# Patient Record
Sex: MALE | Race: OTHER | HISPANIC OR LATINO | Employment: OTHER | URBAN - METROPOLITAN AREA
[De-identification: names, ages, dates, MRNs, and addresses within clinical notes are randomized per-mention and may not be internally consistent; named-entity substitution may affect disease eponyms.]

---

## 2020-04-15 ENCOUNTER — OFFICE VISIT (OUTPATIENT)
Dept: FAMILY MEDICINE CLINIC | Facility: CLINIC | Age: 70
End: 2020-04-15
Payer: COMMERCIAL

## 2020-04-15 VITALS
HEIGHT: 69 IN | HEART RATE: 110 BPM | RESPIRATION RATE: 18 BRPM | SYSTOLIC BLOOD PRESSURE: 122 MMHG | TEMPERATURE: 97.9 F | OXYGEN SATURATION: 99 % | WEIGHT: 205 LBS | DIASTOLIC BLOOD PRESSURE: 72 MMHG | BODY MASS INDEX: 30.36 KG/M2

## 2020-04-15 DIAGNOSIS — R09.81 NASAL CONGESTION: ICD-10-CM

## 2020-04-15 DIAGNOSIS — Z12.5 SCREENING FOR PROSTATE CANCER: ICD-10-CM

## 2020-04-15 DIAGNOSIS — Z95.0 PACEMAKER: ICD-10-CM

## 2020-04-15 DIAGNOSIS — N40.0 BENIGN PROSTATIC HYPERPLASIA WITHOUT LOWER URINARY TRACT SYMPTOMS: ICD-10-CM

## 2020-04-15 DIAGNOSIS — I10 ESSENTIAL HYPERTENSION: Primary | ICD-10-CM

## 2020-04-15 DIAGNOSIS — E66.9 OBESITY (BMI 30-39.9): ICD-10-CM

## 2020-04-15 DIAGNOSIS — Z12.11 ENCOUNTER FOR SCREENING COLONOSCOPY: ICD-10-CM

## 2020-04-15 DIAGNOSIS — R53.83 OTHER FATIGUE: ICD-10-CM

## 2020-04-15 DIAGNOSIS — Z11.59 ENCOUNTER FOR HEPATITIS C SCREENING TEST FOR LOW RISK PATIENT: ICD-10-CM

## 2020-04-15 DIAGNOSIS — R52 GENERALIZED BODY ACHES: ICD-10-CM

## 2020-04-15 DIAGNOSIS — M1A.4790 OTHER SECONDARY CHRONIC GOUT OF ANKLE WITHOUT TOPHUS, UNSPECIFIED LATERALITY: ICD-10-CM

## 2020-04-15 DIAGNOSIS — E78.2 MIXED HYPERLIPIDEMIA: ICD-10-CM

## 2020-04-15 DIAGNOSIS — R73.9 HYPERGLYCEMIA: ICD-10-CM

## 2020-04-15 PROBLEM — M1A.9XX0 CHRONIC GOUT WITHOUT TOPHUS: Status: ACTIVE | Noted: 2020-04-15

## 2020-04-15 PROCEDURE — 1036F TOBACCO NON-USER: CPT | Performed by: NURSE PRACTITIONER

## 2020-04-15 PROCEDURE — 3074F SYST BP LT 130 MM HG: CPT | Performed by: NURSE PRACTITIONER

## 2020-04-15 PROCEDURE — 3008F BODY MASS INDEX DOCD: CPT | Performed by: NURSE PRACTITIONER

## 2020-04-15 PROCEDURE — 1160F RVW MEDS BY RX/DR IN RCRD: CPT | Performed by: NURSE PRACTITIONER

## 2020-04-15 PROCEDURE — 99204 OFFICE O/P NEW MOD 45 MIN: CPT | Performed by: NURSE PRACTITIONER

## 2020-04-15 PROCEDURE — 3078F DIAST BP <80 MM HG: CPT | Performed by: NURSE PRACTITIONER

## 2020-04-15 RX ORDER — TAMSULOSIN HYDROCHLORIDE 0.4 MG/1
0.4 CAPSULE ORAL
COMMUNITY
End: 2020-04-15 | Stop reason: SDUPTHER

## 2020-04-15 RX ORDER — GABAPENTIN 400 MG/1
400 CAPSULE ORAL DAILY
COMMUNITY
End: 2020-04-15 | Stop reason: SDUPTHER

## 2020-04-15 RX ORDER — FLUTICASONE PROPIONATE 50 MCG
2 SPRAY, SUSPENSION (ML) NASAL DAILY
Qty: 16 G | Refills: 0 | Status: SHIPPED | OUTPATIENT
Start: 2020-04-15 | End: 2020-05-18

## 2020-04-15 RX ORDER — ATORVASTATIN CALCIUM 40 MG/1
40 TABLET, FILM COATED ORAL DAILY
Qty: 90 TABLET | Refills: 1 | Status: SHIPPED | OUTPATIENT
Start: 2020-04-15 | End: 2020-08-13

## 2020-04-15 RX ORDER — GABAPENTIN 400 MG/1
400 CAPSULE ORAL DAILY
Qty: 90 CAPSULE | Refills: 0 | Status: SHIPPED | OUTPATIENT
Start: 2020-04-15 | End: 2020-07-06

## 2020-04-15 RX ORDER — AMLODIPINE BESYLATE 5 MG/1
5 TABLET ORAL DAILY
COMMUNITY
End: 2020-04-15 | Stop reason: SDUPTHER

## 2020-04-15 RX ORDER — ATORVASTATIN CALCIUM 40 MG/1
40 TABLET, FILM COATED ORAL DAILY
COMMUNITY
End: 2020-04-15 | Stop reason: SDUPTHER

## 2020-04-15 RX ORDER — ALLOPURINOL 100 MG/1
100 TABLET ORAL DAILY
Qty: 90 TABLET | Refills: 0 | Status: SHIPPED | OUTPATIENT
Start: 2020-04-15 | End: 2020-07-06

## 2020-04-15 RX ORDER — AMLODIPINE BESYLATE 5 MG/1
5 TABLET ORAL DAILY
Qty: 90 TABLET | Refills: 1 | Status: SHIPPED | OUTPATIENT
Start: 2020-04-15 | End: 2020-07-02 | Stop reason: HOSPADM

## 2020-04-15 RX ORDER — UREA 10 %
100 LOTION (ML) TOPICAL DAILY
COMMUNITY

## 2020-04-15 RX ORDER — ALLOPURINOL 100 MG/1
100 TABLET ORAL DAILY
COMMUNITY
End: 2020-04-15 | Stop reason: SDUPTHER

## 2020-04-15 RX ORDER — TAMSULOSIN HYDROCHLORIDE 0.4 MG/1
0.4 CAPSULE ORAL
Qty: 90 CAPSULE | Refills: 2 | Status: SHIPPED | OUTPATIENT
Start: 2020-04-15 | End: 2020-08-26 | Stop reason: SDUPTHER

## 2020-04-15 RX ORDER — LORATADINE 10 MG/1
10 TABLET ORAL DAILY
COMMUNITY
End: 2020-05-14 | Stop reason: SDUPTHER

## 2020-04-15 RX ORDER — ASPIRIN 81 MG/1
81 TABLET ORAL DAILY
COMMUNITY

## 2020-04-16 ENCOUNTER — APPOINTMENT (OUTPATIENT)
Dept: LAB | Facility: HOSPITAL | Age: 70
End: 2020-04-16
Payer: COMMERCIAL

## 2020-04-16 DIAGNOSIS — R53.83 OTHER FATIGUE: ICD-10-CM

## 2020-04-16 DIAGNOSIS — Z12.5 SCREENING FOR PROSTATE CANCER: ICD-10-CM

## 2020-04-16 DIAGNOSIS — E78.2 MIXED HYPERLIPIDEMIA: ICD-10-CM

## 2020-04-16 DIAGNOSIS — E66.9 OBESITY (BMI 30-39.9): ICD-10-CM

## 2020-04-16 DIAGNOSIS — Z12.11 SPECIAL SCREENING FOR MALIGNANT NEOPLASMS, COLON: Primary | ICD-10-CM

## 2020-04-16 DIAGNOSIS — Z11.59 ENCOUNTER FOR HEPATITIS C SCREENING TEST FOR LOW RISK PATIENT: ICD-10-CM

## 2020-04-16 DIAGNOSIS — R73.9 HYPERGLYCEMIA: ICD-10-CM

## 2020-04-16 DIAGNOSIS — Z12.11 ENCOUNTER FOR SCREENING COLONOSCOPY: ICD-10-CM

## 2020-04-16 LAB
ALBUMIN SERPL BCP-MCNC: 4.1 G/DL (ref 3–5.2)
ALP SERPL-CCNC: 96 U/L (ref 43–122)
ALT SERPL W P-5'-P-CCNC: 32 U/L (ref 9–52)
ANION GAP SERPL CALCULATED.3IONS-SCNC: 6 MMOL/L (ref 5–14)
AST SERPL W P-5'-P-CCNC: 26 U/L (ref 17–59)
BASOPHILS # BLD AUTO: 0.1 THOUSANDS/ΜL (ref 0–0.1)
BASOPHILS NFR BLD AUTO: 1 % (ref 0–1)
BILIRUB SERPL-MCNC: 1.7 MG/DL
BUN SERPL-MCNC: 15 MG/DL (ref 5–25)
CALCIUM SERPL-MCNC: 9.4 MG/DL (ref 8.4–10.2)
CHLORIDE SERPL-SCNC: 103 MMOL/L (ref 97–108)
CHOLEST SERPL-MCNC: 188 MG/DL
CO2 SERPL-SCNC: 28 MMOL/L (ref 22–30)
CREAT SERPL-MCNC: 0.76 MG/DL (ref 0.7–1.5)
EOSINOPHIL # BLD AUTO: 0.4 THOUSAND/ΜL (ref 0–0.4)
EOSINOPHIL NFR BLD AUTO: 6 % (ref 0–6)
ERYTHROCYTE [DISTWIDTH] IN BLOOD BY AUTOMATED COUNT: 14 %
EST. AVERAGE GLUCOSE BLD GHB EST-MCNC: 114 MG/DL
GFR SERPL CREATININE-BSD FRML MDRD: 93 ML/MIN/1.73SQ M
GLUCOSE P FAST SERPL-MCNC: 100 MG/DL (ref 70–99)
HBA1C MFR BLD: 5.6 %
HCT VFR BLD AUTO: 43.5 % (ref 41–53)
HCV AB SER QL: NORMAL
HDLC SERPL-MCNC: 77 MG/DL
HEMOCCULT STL QL IA: NEGATIVE
HGB BLD-MCNC: 14.7 G/DL (ref 13.5–17.5)
LDLC SERPL CALC-MCNC: 88 MG/DL
LYMPHOCYTES # BLD AUTO: 2 THOUSANDS/ΜL (ref 0.5–4)
LYMPHOCYTES NFR BLD AUTO: 30 % (ref 25–45)
MCH RBC QN AUTO: 31.5 PG (ref 26–34)
MCHC RBC AUTO-ENTMCNC: 33.9 G/DL (ref 31–36)
MCV RBC AUTO: 93 FL (ref 80–100)
MONOCYTES # BLD AUTO: 0.7 THOUSAND/ΜL (ref 0.2–0.9)
MONOCYTES NFR BLD AUTO: 11 % (ref 1–10)
NEUTROPHILS # BLD AUTO: 3.3 THOUSANDS/ΜL (ref 1.8–7.8)
NEUTS SEG NFR BLD AUTO: 52 % (ref 45–65)
NONHDLC SERPL-MCNC: 111 MG/DL
PLATELET # BLD AUTO: 225 THOUSANDS/UL (ref 150–450)
PMV BLD AUTO: 10.3 FL (ref 8.9–12.7)
POTASSIUM SERPL-SCNC: 3.9 MMOL/L (ref 3.6–5)
PROT SERPL-MCNC: 7.4 G/DL (ref 5.9–8.4)
RBC # BLD AUTO: 4.68 MILLION/UL (ref 4.5–5.9)
SODIUM SERPL-SCNC: 137 MMOL/L (ref 137–147)
TRIGL SERPL-MCNC: 116 MG/DL
TSH SERPL DL<=0.05 MIU/L-ACNC: 1.55 UIU/ML (ref 0.47–4.68)
WBC # BLD AUTO: 6.4 THOUSAND/UL (ref 4.5–11)

## 2020-04-16 PROCEDURE — 80053 COMPREHEN METABOLIC PANEL: CPT

## 2020-04-16 PROCEDURE — G0328 FECAL BLOOD SCRN IMMUNOASSAY: HCPCS

## 2020-04-16 PROCEDURE — 36415 COLL VENOUS BLD VENIPUNCTURE: CPT

## 2020-04-16 PROCEDURE — 83036 HEMOGLOBIN GLYCOSYLATED A1C: CPT

## 2020-04-16 PROCEDURE — 80061 LIPID PANEL: CPT

## 2020-04-16 PROCEDURE — 84153 ASSAY OF PSA TOTAL: CPT

## 2020-04-16 PROCEDURE — 84443 ASSAY THYROID STIM HORMONE: CPT

## 2020-04-16 PROCEDURE — 86803 HEPATITIS C AB TEST: CPT

## 2020-04-16 PROCEDURE — 84154 ASSAY OF PSA FREE: CPT

## 2020-04-16 PROCEDURE — 85025 COMPLETE CBC W/AUTO DIFF WBC: CPT

## 2020-04-17 LAB
PSA FREE MFR SERPL: 35 %
PSA FREE SERPL-MCNC: 0.14 NG/ML
PSA SERPL-MCNC: 0.4 NG/ML (ref 0–4)

## 2020-04-20 DIAGNOSIS — R17 ELEVATED BILIRUBIN: Primary | ICD-10-CM

## 2020-05-12 ENCOUNTER — APPOINTMENT (OUTPATIENT)
Dept: LAB | Facility: HOSPITAL | Age: 70
End: 2020-05-12
Payer: COMMERCIAL

## 2020-05-12 DIAGNOSIS — R17 ELEVATED BILIRUBIN: ICD-10-CM

## 2020-05-12 LAB
ALBUMIN SERPL BCP-MCNC: 4.3 G/DL (ref 3–5.2)
ALP SERPL-CCNC: 109 U/L (ref 43–122)
ALT SERPL W P-5'-P-CCNC: 26 U/L (ref 9–52)
ANION GAP SERPL CALCULATED.3IONS-SCNC: 8 MMOL/L (ref 5–14)
AST SERPL W P-5'-P-CCNC: 27 U/L (ref 17–59)
BILIRUB SERPL-MCNC: 1.5 MG/DL
BUN SERPL-MCNC: 13 MG/DL (ref 5–25)
CALCIUM SERPL-MCNC: 9.4 MG/DL (ref 8.4–10.2)
CHLORIDE SERPL-SCNC: 103 MMOL/L (ref 97–108)
CO2 SERPL-SCNC: 28 MMOL/L (ref 22–30)
CREAT SERPL-MCNC: 0.69 MG/DL (ref 0.7–1.5)
GFR SERPL CREATININE-BSD FRML MDRD: 97 ML/MIN/1.73SQ M
GLUCOSE P FAST SERPL-MCNC: 107 MG/DL (ref 70–99)
POTASSIUM SERPL-SCNC: 3.9 MMOL/L (ref 3.6–5)
PROT SERPL-MCNC: 7.5 G/DL (ref 5.9–8.4)
SODIUM SERPL-SCNC: 139 MMOL/L (ref 137–147)

## 2020-05-12 PROCEDURE — 80053 COMPREHEN METABOLIC PANEL: CPT

## 2020-05-12 PROCEDURE — 36415 COLL VENOUS BLD VENIPUNCTURE: CPT

## 2020-05-14 ENCOUNTER — HOSPITAL ENCOUNTER (OUTPATIENT)
Dept: RADIOLOGY | Facility: HOSPITAL | Age: 70
Discharge: HOME | End: 2020-05-14
Payer: COMMERCIAL

## 2020-05-14 ENCOUNTER — OFFICE VISIT (OUTPATIENT)
Dept: FAMILY MEDICINE CLINIC | Facility: CLINIC | Age: 70
End: 2020-05-14
Payer: COMMERCIAL

## 2020-05-14 VITALS
HEART RATE: 76 BPM | OXYGEN SATURATION: 97 % | TEMPERATURE: 98.3 F | HEIGHT: 69 IN | DIASTOLIC BLOOD PRESSURE: 80 MMHG | WEIGHT: 213.2 LBS | SYSTOLIC BLOOD PRESSURE: 130 MMHG | BODY MASS INDEX: 31.58 KG/M2

## 2020-05-14 DIAGNOSIS — J30.1 SEASONAL ALLERGIC RHINITIS DUE TO POLLEN: ICD-10-CM

## 2020-05-14 DIAGNOSIS — Z00.00 MEDICARE ANNUAL WELLNESS VISIT, SUBSEQUENT: Primary | ICD-10-CM

## 2020-05-14 DIAGNOSIS — R06.02 SHORTNESS OF BREATH: ICD-10-CM

## 2020-05-14 PROCEDURE — 3288F FALL RISK ASSESSMENT DOCD: CPT | Performed by: NURSE PRACTITIONER

## 2020-05-14 PROCEDURE — 1101F PT FALLS ASSESS-DOCD LE1/YR: CPT | Performed by: NURSE PRACTITIONER

## 2020-05-14 PROCEDURE — G0439 PPPS, SUBSEQ VISIT: HCPCS | Performed by: NURSE PRACTITIONER

## 2020-05-14 PROCEDURE — 3008F BODY MASS INDEX DOCD: CPT | Performed by: NURSE PRACTITIONER

## 2020-05-14 PROCEDURE — 3075F SYST BP GE 130 - 139MM HG: CPT | Performed by: NURSE PRACTITIONER

## 2020-05-14 PROCEDURE — 1160F RVW MEDS BY RX/DR IN RCRD: CPT | Performed by: NURSE PRACTITIONER

## 2020-05-14 PROCEDURE — 99214 OFFICE O/P EST MOD 30 MIN: CPT | Performed by: NURSE PRACTITIONER

## 2020-05-14 PROCEDURE — 3079F DIAST BP 80-89 MM HG: CPT | Performed by: NURSE PRACTITIONER

## 2020-05-14 PROCEDURE — 93000 ELECTROCARDIOGRAM COMPLETE: CPT | Performed by: NURSE PRACTITIONER

## 2020-05-14 PROCEDURE — 71046 X-RAY EXAM CHEST 2 VIEWS: CPT

## 2020-05-14 PROCEDURE — 1170F FXNL STATUS ASSESSED: CPT | Performed by: NURSE PRACTITIONER

## 2020-05-14 PROCEDURE — 1036F TOBACCO NON-USER: CPT | Performed by: NURSE PRACTITIONER

## 2020-05-14 PROCEDURE — 1125F AMNT PAIN NOTED PAIN PRSNT: CPT | Performed by: NURSE PRACTITIONER

## 2020-05-14 RX ORDER — LORATADINE 10 MG/1
10 TABLET ORAL DAILY
Qty: 90 TABLET | Refills: 3 | Status: SHIPPED | OUTPATIENT
Start: 2020-05-14 | End: 2020-06-18

## 2020-05-16 DIAGNOSIS — R09.81 NASAL CONGESTION: ICD-10-CM

## 2020-05-18 ENCOUNTER — CONSULT (OUTPATIENT)
Dept: CARDIOLOGY CLINIC | Facility: CLINIC | Age: 70
End: 2020-05-18
Payer: COMMERCIAL

## 2020-05-18 VITALS
DIASTOLIC BLOOD PRESSURE: 70 MMHG | SYSTOLIC BLOOD PRESSURE: 130 MMHG | TEMPERATURE: 97.2 F | BODY MASS INDEX: 31.16 KG/M2 | WEIGHT: 211 LBS

## 2020-05-18 DIAGNOSIS — I34.0 NONRHEUMATIC MITRAL VALVE REGURGITATION: ICD-10-CM

## 2020-05-18 DIAGNOSIS — R94.31 ABNORMAL ECG: ICD-10-CM

## 2020-05-18 DIAGNOSIS — Z95.0 PACEMAKER: ICD-10-CM

## 2020-05-18 DIAGNOSIS — R06.02 SHORTNESS OF BREATH ON EXERTION: ICD-10-CM

## 2020-05-18 DIAGNOSIS — I10 ESSENTIAL HYPERTENSION: Primary | ICD-10-CM

## 2020-05-18 DIAGNOSIS — I36.1 NONRHEUMATIC TRICUSPID VALVE REGURGITATION: ICD-10-CM

## 2020-05-18 DIAGNOSIS — I25.10 NONOBSTRUCTIVE ATHEROSCLEROSIS OF CORONARY ARTERY: ICD-10-CM

## 2020-05-18 DIAGNOSIS — I49.3 PREMATURE VENTRICULAR CONTRACTION: ICD-10-CM

## 2020-05-18 DIAGNOSIS — I49.5 SICK SINUS SYNDROME (HCC): ICD-10-CM

## 2020-05-18 DIAGNOSIS — I51.89 DIASTOLIC DYSFUNCTION WITHOUT HEART FAILURE: ICD-10-CM

## 2020-05-18 DIAGNOSIS — E78.2 MIXED HYPERLIPIDEMIA: ICD-10-CM

## 2020-05-18 PROCEDURE — 99204 OFFICE O/P NEW MOD 45 MIN: CPT | Performed by: INTERNAL MEDICINE

## 2020-05-18 RX ORDER — FLUTICASONE PROPIONATE 50 MCG
SPRAY, SUSPENSION (ML) NASAL
Qty: 16 ML | Refills: 0 | Status: SHIPPED | OUTPATIENT
Start: 2020-05-18 | End: 2020-06-11

## 2020-05-28 ENCOUNTER — HOSPITAL ENCOUNTER (OUTPATIENT)
Dept: NUCLEAR MEDICINE | Facility: HOSPITAL | Age: 70
Discharge: HOME | End: 2020-05-28
Attending: INTERNAL MEDICINE
Payer: COMMERCIAL

## 2020-05-28 ENCOUNTER — HOSPITAL ENCOUNTER (OUTPATIENT)
Dept: NON INVASIVE DIAGNOSTICS | Facility: HOSPITAL | Age: 70
Discharge: HOME | End: 2020-05-28
Attending: INTERNAL MEDICINE
Payer: COMMERCIAL

## 2020-05-28 DIAGNOSIS — I49.3 PREMATURE VENTRICULAR CONTRACTION: ICD-10-CM

## 2020-05-28 DIAGNOSIS — I49.5 SICK SINUS SYNDROME (HCC): ICD-10-CM

## 2020-05-28 DIAGNOSIS — R94.31 ABNORMAL ECG: ICD-10-CM

## 2020-05-28 DIAGNOSIS — Z95.0 PACEMAKER: ICD-10-CM

## 2020-05-28 DIAGNOSIS — R06.02 SHORTNESS OF BREATH ON EXERTION: ICD-10-CM

## 2020-05-28 DIAGNOSIS — I25.10 NONOBSTRUCTIVE ATHEROSCLEROSIS OF CORONARY ARTERY: ICD-10-CM

## 2020-05-28 PROCEDURE — 78452 HT MUSCLE IMAGE SPECT MULT: CPT | Performed by: INTERNAL MEDICINE

## 2020-05-28 PROCEDURE — 93016 CV STRESS TEST SUPVJ ONLY: CPT | Performed by: INTERNAL MEDICINE

## 2020-05-28 PROCEDURE — 93017 CV STRESS TEST TRACING ONLY: CPT

## 2020-05-28 PROCEDURE — 93018 CV STRESS TEST I&R ONLY: CPT | Performed by: INTERNAL MEDICINE

## 2020-05-28 PROCEDURE — 93225 XTRNL ECG REC<48 HRS REC: CPT

## 2020-05-28 PROCEDURE — 78452 HT MUSCLE IMAGE SPECT MULT: CPT

## 2020-05-28 PROCEDURE — A9502 TC99M TETROFOSMIN: HCPCS

## 2020-05-28 PROCEDURE — 93226 XTRNL ECG REC<48 HR SCAN A/R: CPT

## 2020-05-28 RX ADMIN — REGADENOSON 0.4 MG: 0.08 INJECTION, SOLUTION INTRAVENOUS at 11:01

## 2020-05-29 LAB
CHEST PAIN STATEMENT: NORMAL
MAX DIASTOLIC BP: 80 MMHG
MAX HEART RATE: 98 BPM
MAX PREDICTED HEART RATE: 151 BPM
MAX. SYSTOLIC BP: 138 MMHG
PROTOCOL NAME: NORMAL
REASON FOR TERMINATION: NORMAL
TARGET HR FORMULA: NORMAL
TIME IN EXERCISE PHASE: NORMAL

## 2020-06-02 PROCEDURE — 93227 XTRNL ECG REC<48 HR R&I: CPT

## 2020-06-09 ENCOUNTER — TELEPHONE (OUTPATIENT)
Dept: CARDIOLOGY CLINIC | Facility: CLINIC | Age: 70
End: 2020-06-09

## 2020-06-10 ENCOUNTER — IN-CLINIC DEVICE VISIT (OUTPATIENT)
Dept: CARDIOLOGY CLINIC | Facility: CLINIC | Age: 70
End: 2020-06-10
Payer: COMMERCIAL

## 2020-06-10 DIAGNOSIS — Z95.0 PRESENCE OF CARDIAC PACEMAKER: Primary | ICD-10-CM

## 2020-06-10 PROCEDURE — 93280 PM DEVICE PROGR EVAL DUAL: CPT | Performed by: INTERNAL MEDICINE

## 2020-06-11 DIAGNOSIS — R09.81 NASAL CONGESTION: ICD-10-CM

## 2020-06-11 RX ORDER — FLUTICASONE PROPIONATE 50 MCG
SPRAY, SUSPENSION (ML) NASAL
Qty: 16 ML | Refills: 0 | Status: SHIPPED | OUTPATIENT
Start: 2020-06-11 | End: 2020-06-16

## 2020-06-15 DIAGNOSIS — R09.81 NASAL CONGESTION: ICD-10-CM

## 2020-06-16 RX ORDER — FLUTICASONE PROPIONATE 50 MCG
SPRAY, SUSPENSION (ML) NASAL
Qty: 48 ML | Refills: 1 | Status: SHIPPED | OUTPATIENT
Start: 2020-06-16 | End: 2020-06-18

## 2020-06-18 ENCOUNTER — OFFICE VISIT (OUTPATIENT)
Dept: FAMILY MEDICINE CLINIC | Facility: CLINIC | Age: 70
End: 2020-06-18
Payer: COMMERCIAL

## 2020-06-18 ENCOUNTER — APPOINTMENT (OUTPATIENT)
Dept: LAB | Facility: HOSPITAL | Age: 70
End: 2020-06-18
Payer: COMMERCIAL

## 2020-06-18 VITALS
BODY MASS INDEX: 31.1 KG/M2 | TEMPERATURE: 98.3 F | SYSTOLIC BLOOD PRESSURE: 130 MMHG | DIASTOLIC BLOOD PRESSURE: 80 MMHG | OXYGEN SATURATION: 95 % | HEIGHT: 69 IN | HEART RATE: 99 BPM | WEIGHT: 210 LBS

## 2020-06-18 DIAGNOSIS — M1A.9XX0 CHRONIC GOUT WITHOUT TOPHUS, UNSPECIFIED CAUSE, UNSPECIFIED SITE: ICD-10-CM

## 2020-06-18 DIAGNOSIS — R09.81 NASAL CONGESTION: Primary | ICD-10-CM

## 2020-06-18 LAB — URATE SERPL-MCNC: 5.3 MG/DL (ref 3.5–8.5)

## 2020-06-18 PROCEDURE — 1160F RVW MEDS BY RX/DR IN RCRD: CPT | Performed by: NURSE PRACTITIONER

## 2020-06-18 PROCEDURE — 99214 OFFICE O/P EST MOD 30 MIN: CPT | Performed by: NURSE PRACTITIONER

## 2020-06-18 PROCEDURE — 3075F SYST BP GE 130 - 139MM HG: CPT | Performed by: NURSE PRACTITIONER

## 2020-06-18 PROCEDURE — 84550 ASSAY OF BLOOD/URIC ACID: CPT

## 2020-06-18 PROCEDURE — 1036F TOBACCO NON-USER: CPT | Performed by: NURSE PRACTITIONER

## 2020-06-18 PROCEDURE — 36415 COLL VENOUS BLD VENIPUNCTURE: CPT

## 2020-06-18 PROCEDURE — 3008F BODY MASS INDEX DOCD: CPT | Performed by: NURSE PRACTITIONER

## 2020-06-18 PROCEDURE — 3079F DIAST BP 80-89 MM HG: CPT | Performed by: NURSE PRACTITIONER

## 2020-06-18 RX ORDER — CETIRIZINE HYDROCHLORIDE 10 MG/1
10 TABLET ORAL DAILY
Qty: 30 TABLET | Refills: 3 | Status: SHIPPED | OUTPATIENT
Start: 2020-06-18 | End: 2020-08-26 | Stop reason: SDUPTHER

## 2020-06-18 RX ORDER — AZELASTINE HYDROCHLORIDE, FLUTICASONE PROPIONATE 137; 50 UG/1; UG/1
1 SPRAY, METERED NASAL DAILY
Qty: 23 G | Refills: 0 | Status: SHIPPED | OUTPATIENT
Start: 2020-06-18

## 2020-07-02 ENCOUNTER — OFFICE VISIT (OUTPATIENT)
Dept: CARDIOLOGY CLINIC | Facility: CLINIC | Age: 70
End: 2020-07-02
Payer: COMMERCIAL

## 2020-07-02 VITALS
DIASTOLIC BLOOD PRESSURE: 60 MMHG | HEART RATE: 90 BPM | WEIGHT: 211 LBS | SYSTOLIC BLOOD PRESSURE: 130 MMHG | TEMPERATURE: 98.4 F | BODY MASS INDEX: 31.16 KG/M2

## 2020-07-02 DIAGNOSIS — I49.5 SICK SINUS SYNDROME (HCC): ICD-10-CM

## 2020-07-02 DIAGNOSIS — E66.9 OBESITY (BMI 30-39.9): ICD-10-CM

## 2020-07-02 DIAGNOSIS — I49.9 PMT (PACEMAKER-MEDIATED TACHYCARDIA): ICD-10-CM

## 2020-07-02 DIAGNOSIS — I51.89 DIASTOLIC DYSFUNCTION WITHOUT HEART FAILURE: ICD-10-CM

## 2020-07-02 DIAGNOSIS — Z95.0 PACEMAKER: ICD-10-CM

## 2020-07-02 DIAGNOSIS — I36.1 NONRHEUMATIC TRICUSPID VALVE REGURGITATION: ICD-10-CM

## 2020-07-02 DIAGNOSIS — I34.0 NONRHEUMATIC MITRAL VALVE REGURGITATION: ICD-10-CM

## 2020-07-02 DIAGNOSIS — I47.1 PAROXYSMAL ATRIAL TACHYCARDIA (HCC): ICD-10-CM

## 2020-07-02 DIAGNOSIS — E78.2 MIXED HYPERLIPIDEMIA: ICD-10-CM

## 2020-07-02 DIAGNOSIS — I25.10 NONOBSTRUCTIVE ATHEROSCLEROSIS OF CORONARY ARTERY: Primary | ICD-10-CM

## 2020-07-02 PROCEDURE — 99214 OFFICE O/P EST MOD 30 MIN: CPT | Performed by: INTERNAL MEDICINE

## 2020-07-02 PROCEDURE — 3078F DIAST BP <80 MM HG: CPT | Performed by: INTERNAL MEDICINE

## 2020-07-02 PROCEDURE — 1036F TOBACCO NON-USER: CPT | Performed by: INTERNAL MEDICINE

## 2020-07-02 PROCEDURE — 1160F RVW MEDS BY RX/DR IN RCRD: CPT | Performed by: INTERNAL MEDICINE

## 2020-07-02 PROCEDURE — 3075F SYST BP GE 130 - 139MM HG: CPT | Performed by: INTERNAL MEDICINE

## 2020-07-02 RX ORDER — METOPROLOL SUCCINATE 25 MG/1
25 TABLET, EXTENDED RELEASE ORAL 2 TIMES DAILY
Qty: 60 TABLET | Refills: 6 | Status: SHIPPED | OUTPATIENT
Start: 2020-07-02 | End: 2020-07-30 | Stop reason: HOSPADM

## 2020-07-02 NOTE — PROGRESS NOTES
[facility]  [id number] [facility], Arron Suarez  Phone#  [phone]  Fax#  [phone]  *-*-*-*-*-*-*-*-*-*-*-*-*-*-*-*-*-*-*-*-*-*-*-*-*-*-*-*-*-*-*-*-*-*-*-*-*-*-*-*-*-*-*-*-*-*-*-*-*-*-*-*-*-*  Caitlyn Lack DATE: 07/02/20 8:34 AM  PATIENT NAME: Rosmery Thompson   1950    [id number]  Age: 72 y o  Sex: male  AUTHOR: Shayna De Jesus MD  PRIMARYCARE PHYSICIAN: FELICITAS Hensley    DIAGNOSES:  1  Essential hypertension  2  Mixed dyslipidemia  3  Nonobstructive coronary artery disease  4  Sick sinus syndrome status post dual-chamber pacemaker implantation in May 2019 ([address] [place] [address])  5  Paroxysmal atrial tachycardia and episodic pacemaker mediated tachycardia   6  BPH  7  History of hernia repair  8  History of hemorrhoidectomy  9  BPH  10  Depression and anxiety  11  Renal calculi  12  History of Bi clavicular fracture  13  Diastolic dysfunction without congestive heart failure  14  Mild cerebral volume loss with mild chronic small vessel ischemia with focal and fellow malaise see a in right parietal region suspicious for old infarct, abnormal EEG  15  Gout    Lexiscan nuclear stress test May 28, 2020:  1  Negative pharmacologic nuclear stress test for symptoms of angina pectoris and negative for ECG changes of ischemia  2  Probable normal myocardial perfusion scan without definite evidence of ischemia or infarction  3  Mildly dilated left ventricular cavity  Although the determined left ventricular ejection fraction was mildly decreased at 44%, visual evaluation suggests normal left ventricular systolic function and normal ejection fraction  4  Normal resting blood pressure with appropriate blood pressure response during the stress test   5  Baseline ECG abnormality with atrial paced rhythm with frequent premature ventricular ectopy noted during the stress test      Holter monitor May 2020:   Total beats:  105,944     Ventricular Ectopy  Total VE Beats:  11,880  VE percentage: 11 2 %     Supraventricular Ectopy  Total SVE Beat:  3463  SVE percentage:  3 3 %     Atrial Fibrillation  AF beats:  0  AF percentage 0 %     CONCLUSIONS:  1  Holter monitor reveals the underlying rhythm is sinus rhythm with an average heart rate of 74 beats per minute, a minimum heart rate of 58 beats per minute and a maximum heart rate of 112 beats per minute  2  There were about 11,880 ventricular ectopic beats, however some beats are classified as ectopic ventricular beats when they are mostly paced beats  3  There were 37 ventricular runs classified by the Holter recording as ventricular tachycardia, however upon further review, the rhythm appears to be a paced ventricular tachycardic rhythm during these times, consider pacemaker mediated tachycardia  For the most part these ventricular runs are initiated by a premature ventricular contraction  4  There were about 3463 supraventricular ectopic beats, mostly occurring as single PAC's and late beats with no evidence of supraventricular tachycardia, atrial fibrillation, or atrial flutter  5  There is no evidence of significant bradyarrhythmia or advanced heart block  The longest R to R was 1 3 seconds  6  The patient's symptom diary reported no symptoms     CURRENT ECG:  No results found for this visit on 07/02/20  CARDIOLOGY ASSESSMENT & PLAN:  1  Nonobstructive atherosclerosis of coronary artery     2  Sick sinus syndrome (HCC)  metoprolol succinate (TOPROL-XL) 25 mg 24 hr tablet   3  Nonrheumatic mitral valve regurgitation     4  Nonrheumatic tricuspid valve regurgitation     5  Pacemaker     6  Obesity (BMI 30-39 9)     7  Diastolic dysfunction without heart failure  metoprolol succinate (TOPROL-XL) 25 mg 24 hr tablet   8  PMT (pacemaker-mediated tachycardia)     9  Mixed hyperlipidemia     10  Paroxysmal atrial tachycardia (HCC)  metoprolol succinate (TOPROL-XL) 25 mg 24 hr tablet     Nonobstructive atherosclerosis of coronary artery  Mr   [id number] [facility]  Randlal Guzman is overall doing all right from cardiac perspective  He does have some atypical chest pains  Nuclear stress test done recently shows no definite evidence of ischemia  His ejection fraction was mildly decreased  His recent device interrogation shows episodes of nonsustained paroxysmal atrial tachycardia and episode of pacemaker mediated tachycardia  Recent Holter monitor suggests PMT episode and atrial tachycardia with ventricular tracking  We are making the following changes to his medical regimen  - we are discontinuing amlodipine  - we are initiating metoprolol succinate therapy at 25 mg twice daily   - I am advising him to check random blood pressures and heart rate at home and record on the tracking sheet we are providing  When he has about 15-20 readings he is advised to drop of a copy of the record for us to review  - our goal blood pressure for him would be systolic 388A-877F/[id number] 81O-27U  He is advised to call us if it is below or above this range  - he will follow-up regularly in the device Clinic  Will have to consider making changes to his device sitting if he is having more frequent PMT episodes  INTERVAL HISTORY & HISTORY OF PRESENT ILLNESS:  Marti Hamlamin is here for follow-up regarding his cardiac comorbidities which include:  Nonobstructive CAD, history of pacemaker implantation, chest pain and other comorbidities  Patient was seen by us in May for establishing care after recently moving from [facility]   He was complaining of exertional shortness of breath and chest pain and referred for nuclear stress test along with a Holter monitor  He is here for discussing results  He for reports that he is feeling better in terms of exercise capacity as he is more active now  He does report occasional chest discomfort which he describes as dull pain in anterior chest   Symptom is of from brought on by anxiety and worry  He denies palpitations    Denies presyncope/syncope, orthopnea, PND or worsening pedal edema  Functional capacity status:  Good   (Excellent- >10 METs; Good: (7-10 METs); Moderate (4-7 METs); Poor (<= 4 METs)    Any chronic stressors:  None   (feeling of poor health, financial problems, and social isolation etc)  Tobacco or alcohol dependence:  None  Rare alcohol use  REVIEW OF SYMPTOMS:    Positive for:  Occasional left anterior chest pain  Negative for: All remaining as reviewed below and in HPI  SYSTEM SYMPTOMS REVIEWED:  General--weight change, fever, night sweats  Respiratory--cough, wheezing, shortness of breath, sputum production  Cardiovascular--chest pain, syncope, dyspnea on exertion, edema, decline in exercise tolerance, claudication   Gastrointestinal--persistent vomiting, diarrhea, abdominal distention, blood in stool   Muscular or skeletal--joint pain or swelling   Neurologic--headaches, syncope, abnormal movement  Hematologic--history of easy bruising and bleeding   Endocrine--thyroid enlargement, heat or cold intolerance, polyuria   Psychiatric--anxiety, depression     *-*-*-*-*-*-*-*-*-*-*-*-*-*-*-*-*-*-*-*-*-*-*-*-*-*-*-*-*-*-*-*-*-*-*-*-*-*-*-*-*-*-*-*-*-*-*-*-*-*-*-*-*-*-  VITAL SIGNS:  Vitals:    07/02/20 0805   BP: 130/60   BP Location: Left arm   Patient Position: Sitting   Cuff Size: Adult   Pulse: 90   Temp: 98 4 °F (36 9 °C)   Weight: 95 7 kg (211 lb)     Weight (last 2 days)     Date/Time   Weight    07/02/20 0805   95 7 (211)           ,   Wt Readings from Last 3 Encounters:   07/02/20 95 7 kg (211 lb)   06/18/20 95 3 kg (210 lb)   05/18/20 95 7 kg (211 lb)    , Body mass index is 31 16 kg/m²  *-*-*-*-*-*-*-*-*-*-*-*-*-*-*-*-*-*-*-*-*-*-*-*-*-*-*-*-*-*-*-*-*-*-*-*-*-*-*-*-*-*-*-*-*-*-*-*-*-*-*-*-*-*-  PHYSICAL EXAM:  General Appearance:    Alert, cooperative, no distress, appears stated age, tall well-built  Head, Eyes, ENT:    No obvious abnormality, moist mucous mebranes     Neck:   Supple, no carotid bruit or JVD   Back:     Symmetric, no curvature  Lungs:     Respirations unlabored  Clear to auscultation bilaterally,    Chest wall:    No tenderness or deformity   Heart:    Regular rate and rhythm, S1 and S2 normal, no murmur, rub  or gallop  Abdomen:     Soft, non-tender, No obvious masses, or organomegaly   Extremities:   Extremities normal, no cyanosis or edema    Skin:   Skin color, texture, turgor normal, no rashes or lesions     *-*-*-*-*-*-*-*-*-*-*-*-*-*-*-*-*-*-*-*-*-*-*-*-*-*-*-*-*-*-*-*-*-*-*-*-*-*-*-*-*-*-*-*-*-*-*-*-*-*-*-*-*-*-  CURRENT MEDICATION LIST:    Current Outpatient Medications:     allopurinol (ZYLOPRIM) 100 mg tablet, Take 1 tablet (100 mg total) by mouth daily, Disp: 90 tablet, Rfl: 0    aspirin (ECOTRIN LOW STRENGTH) 81 mg EC tablet, Take 81 mg by mouth daily, Disp: , Rfl:     atorvastatin (LIPITOR) 40 mg tablet, Take 1 tablet (40 mg total) by mouth daily, Disp: 90 tablet, Rfl: 1    cetirizine (ZyrTEC) 10 mg tablet, Take 1 tablet (10 mg total) by mouth daily, Disp: 30 tablet, Rfl: 3    gabapentin (NEURONTIN) 400 mg capsule, Take 1 capsule (400 mg total) by mouth daily, Disp: 90 capsule, Rfl: 0    tamsulosin (FLOMAX) 0 4 mg, Take 1 capsule (0 4 mg total) by mouth daily with dinner, Disp: 90 capsule, Rfl: 2    vitamin B-12 (CYANOCOBALAMIN) 100 MCG tablet, Take 100 mcg by mouth daily, Disp: , Rfl:     Azelastine-Fluticasone (Dymista) 137-50 MCG/ACT SUSP, 1 puff into each nostril daily (Patient not taking: Reported on 7/2/2020), Disp: 23 g, Rfl: 0    metoprolol succinate (TOPROL-XL) 25 mg 24 hr tablet, Take 1 tablet (25 mg total) by mouth 2 (two) times a day, Disp: 60 tablet, Rfl: 6    ALLERGIES:  No Known Allergies    *-*-*-*-*-*-*-*-*-*-*-*-*-*-*-*-*-*-*-*-*-*-*-*-*-*-*-*-*-*-*-*-*-*-*-*-*-*-*-*-*-*-*-*-*-*-*-*-*-*-*-*-*-*-  The LABORATORY DATA:  I have personally reviewed pertinent labs      No results found for: NA  Potassium   Date Value Ref Range Status 05/12/2020 3 9 3 6 - 5 0 mmol/L Final   04/16/2020 3 9 3 6 - 5 0 mmol/L Final     Chloride   Date Value Ref Range Status   05/12/2020 103 97 - 108 mmol/L Final   04/16/2020 103 97 - 108 mmol/L Final     CO2   Date Value Ref Range Status   05/12/2020 28 22 - 30 mmol/L Final   04/16/2020 28 22 - 30 mmol/L Final     BUN   Date Value Ref Range Status   05/12/2020 13 5 - 25 mg/dL Final   04/16/2020 15 5 - 25 mg/dL Final     Creatinine   Date Value Ref Range Status   05/12/2020 0 69 (L) 0 70 - 1 50 mg/dL Final     Comment:     Standardized to IDMS reference method   04/16/2020 0 76 0 70 - 1 50 mg/dL Final     Comment:     Standardized to IDMS reference method     eGFR   Date Value Ref Range Status   05/12/2020 97 >60 ml/min/1 73sq m Final   04/16/2020 93 >60 ml/min/1 73sq m Final     Calcium   Date Value Ref Range Status   05/12/2020 9 4 8 4 - 10 2 mg/dL Final   04/16/2020 9 4 8 4 - 10 2 mg/dL Final     AST   Date Value Ref Range Status   05/12/2020 27 17 - 59 U/L Final     Comment:       Specimen collection should occur prior to Sulfasalazine administration due to the potential for falsely depressed results  04/16/2020 26 17 - 59 U/L Final     Comment:       Specimen collection should occur prior to Sulfasalazine administration due to the potential for falsely depressed results  ALT   Date Value Ref Range Status   05/12/2020 26 9 - 52 U/L Final     Comment:       Specimen collection should occur prior to Sulfasalazine administration due to the potential for falsely depressed results  04/16/2020 32 9 - 52 U/L Final     Comment:       Specimen collection should occur prior to Sulfasalazine administration due to the potential for falsely depressed results        Alkaline Phosphatase   Date Value Ref Range Status   05/12/2020 109 43 - 122 U/L Final   04/16/2020 96 43 - 122 U/L Final     WBC   Date Value Ref Range Status   04/16/2020 6 40 4 50 - 11 00 Thousand/uL Final     Hemoglobin   Date Value Ref Range Status 04/16/2020 14 7 13 5 - 17 5 g/dL Final     Platelets   Date Value Ref Range Status   04/16/2020 225 150 - 450 Thousands/uL Final     No results found for: PT, PTT, INR  No results found for: CKMB, DIGOXIN  No results found for: TSH  No results found for: CHOL   Hemoglobin A1C   Date Value Ref Range Status   04/16/2020 5 6 Normal 3 8-5 6%; PreDiabetic 5 7-6 4%; Diabetic >=6 5%; Glycemic control for adults with diabetes <7 0% % Final     No results found for: Sudha Betters, GRAMSTAIN, URINECX, WOUNDCULT, BODYFLUIDCUL, MRSACULTURE, INFLUAPCR, INFLUBPCR, RSVPCR, LEGIONELLAUR, CDIFFTOXINB    *-*-*-*-*-*-*-*-*-*-*-*-*-*-*-*-*-*-*-*-*-*-*-*-*-*-*-*-*-*-*-*-*-*-*-*-*-*-*-*-*-*-*-*-*-*-*-*-*-*-*-*-*-*-  PREVIOUS CARDIOLOGY & RADIOLOGY RESULTS:  No results found for this or any previous visit  No results found for this or any previous visit  No results found for this or any previous visit  No results found for this or any previous visit  Cardiac EP device report  DEVICE INTERROGATED IN THE [place] OFFICE  TEMP: 97 3  BATTERY VOLTAGE ADEQUATE (9 3 YRS)  AP: 21%/  : 13%  ALL AVAILABLE LEAD PARAMETERS WITHIN NORMAL LIMITS  9 AMS EPISODES W/ AVAIL EGRMS SHOWING PAT, MAX DURATION 36 SECS  3 MAGNET RESPONSE EPISODES DETECTED  PT TAKES ASA 81MG  EF: 44% (NUC ST TX 5/28/20)  NO PROGRAMMING CHANGES MADE TO DEVICE PARAMETERS  PACEMAKER FUNCTIONING APPROPRIATELY    [address] [place] [address]        *-*-*-*-*-*-*-*-*-*-*-*-*-*-*-*-*-*-*-*-*-*-*-*-*-*-*-*-*-*-*-*-*-*-*-*-*-*-*-*-*-*-*-*-*-*-*-*-*-*-*-*-*-*-  SIGNATURES:   [unfilled]   Shayna De Jesus MD     *-*-*-*-*-*-*-*-*-*-*-*-*-*-*-*-*-*-*-*-*-*-*-*-*-*-*-*-*-*-*-*-*-*-*-*-*-*-*-*-*-*-*-*-*-*-*-*-*-*-*-*-*-*-    PAST MEDICAL HISTORY:  Past Medical History:   Diagnosis Date    Allergic     Asthma     Enlarged prostate     High cholesterol     High triglycerides     Hypertension     PAST SURGICAL HISTORY:   Past Surgical History:   Procedure Laterality Date    HEMORROIDECTOMY      HERNIA REPAIR           FAMILY HISTORY:  Family History   Problem Relation Age of Onset    Heart disease Mother     Hyperlipidemia Maternal Grandfather     SOCIAL HISTORY:  Social History     Tobacco Use   Smoking Status Never Smoker   Smokeless Tobacco Never Used      Social History     Substance and Sexual Activity   Alcohol Use Not Currently     Social History     Substance and Sexual Activity   Drug Use Never    [unfilled]     *-*-*-*-*-*-*-*-*-*-*-*-*-*-*-*-*-*-*-*-*-*-*-*-*-*-*-*-*-*-*-*-*-*-*-*-*-*-*-*-*-*-*-*-*-*-*-*-*-*-*-*-*-*  ALLERGIES:  No Known Allergies CURRENT SCHEDULED MEDICATIONS:    Current Outpatient Medications:     allopurinol (ZYLOPRIM) 100 mg tablet, Take 1 tablet (100 mg total) by mouth daily, Disp: 90 tablet, Rfl: 0    aspirin (ECOTRIN LOW STRENGTH) 81 mg EC tablet, Take 81 mg by mouth daily, Disp: , Rfl:     atorvastatin (LIPITOR) 40 mg tablet, Take 1 tablet (40 mg total) by mouth daily, Disp: 90 tablet, Rfl: 1    cetirizine (ZyrTEC) 10 mg tablet, Take 1 tablet (10 mg total) by mouth daily, Disp: 30 tablet, Rfl: 3    gabapentin (NEURONTIN) 400 mg capsule, Take 1 capsule (400 mg total) by mouth daily, Disp: 90 capsule, Rfl: 0    tamsulosin (FLOMAX) 0 4 mg, Take 1 capsule (0 4 mg total) by mouth daily with dinner, Disp: 90 capsule, Rfl: 2    vitamin B-12 (CYANOCOBALAMIN) 100 MCG tablet, Take 100 mcg by mouth daily, Disp: , Rfl:     Azelastine-Fluticasone (Dymista) 137-50 MCG/ACT SUSP, 1 puff into each nostril daily (Patient not taking: Reported on 7/2/2020), Disp: 23 g, Rfl: 0    metoprolol succinate (TOPROL-XL) 25 mg 24 hr tablet, Take 1 tablet (25 mg total) by mouth 2 (two) times a day, Disp: 60 tablet, Rfl: 6     *-*-*-*-*-*-*-*-*-*-*-*-*-*-*-*-*-*-*-*-*-*-*-*-*-*-*-*-*-*-*-*-*-*-*-*-*-*-*-*-*-*-*-*-*-*-*-*-*-*-*-*-*-*

## 2020-07-02 NOTE — ASSESSMENT & PLAN NOTE
Mr Seth Morales is overall doing all right from cardiac perspective  He does have some atypical chest pains  Nuclear stress test done recently shows no definite evidence of ischemia  His ejection fraction was mildly decreased  His recent device interrogation shows episodes of nonsustained paroxysmal atrial tachycardia and episode of pacemaker mediated tachycardia  Recent Holter monitor suggests PMT episode and atrial tachycardia with ventricular tracking  We are making the following changes to his medical regimen  - we are discontinuing amlodipine  - we are initiating metoprolol succinate therapy at 25 mg twice daily   - I am advising him to check random blood pressures and heart rate at home and record on the tracking sheet we are providing  When he has about 15-20 readings he is advised to drop of a copy of the record for us to review  - our goal blood pressure for him would be systolic 500D-893P/[id number] 75L-62R  He is advised to call us if it is below or above this range  - he will follow-up regularly in the device Clinic  Will have to consider making changes to his device sitting if he is having more frequent PMT episodes

## 2020-07-02 NOTE — PATIENT INSTRUCTIONS
CARDIOLOGY ASSESSMENT & PLAN:  1  Nonobstructive atherosclerosis of coronary artery     2  Sick sinus syndrome (HCC)  metoprolol succinate (TOPROL-XL) 25 mg 24 hr tablet   3  Nonrheumatic mitral valve regurgitation     4  Nonrheumatic tricuspid valve regurgitation     5  Pacemaker     6  Obesity (BMI 30-39 9)     7  Diastolic dysfunction without heart failure  metoprolol succinate (TOPROL-XL) 25 mg 24 hr tablet   8  PMT (pacemaker-mediated tachycardia)     9  Mixed hyperlipidemia     10  Paroxysmal atrial tachycardia (HCC)  metoprolol succinate (TOPROL-XL) 25 mg 24 hr tablet     Nonobstructive atherosclerosis of coronary artery  Mr Lizette Srivastava is overall doing all right from cardiac perspective  He does have some atypical chest pains  Nuclear stress test done recently shows no definite evidence of ischemia  His ejection fraction was mildly decreased  His recent device interrogation shows episodes of nonsustained paroxysmal atrial tachycardia and episode of pacemaker mediated tachycardia  Recent Holter monitor suggests PMT episode and atrial tachycardia with ventricular tracking  We are making the following changes to his medical regimen  - we are discontinuing amlodipine  - we are initiating metoprolol succinate therapy at 25 mg twice daily   - I am advising him to check random blood pressures and heart rate at home and record on the tracking sheet we are providing  When he has about 15-20 readings he is advised to drop of a copy of the record for us to review  - our goal blood pressure for him would be systolic [id number]/[id number] 11Y-95I  He is advised to call us if it is below or above this range  - he will follow-up regularly in the device Clinic  Will have to consider making changes to his device sitting if he is having more frequent PMT episodes  DIAGNOSES:  1  Essential hypertension  2  Mixed dyslipidemia  3  Nonobstructive coronary artery disease  4   Sick sinus syndrome status post dual-chamber pacemaker implantation in May 2019 ([facility])  5  Paroxysmal atrial tachycardia and episodic pacemaker mediated tachycardia   6  BPH  7  History of hernia repair  8  History of hemorrhoidectomy  9  BPH  10  Depression and anxiety  11  Renal calculi  12  History of Bi clavicular fracture  13  Diastolic dysfunction without congestive heart failure  14  Mild cerebral volume loss with mild chronic small vessel ischemia with focal and fellow malaise see a in right parietal region suspicious for old infarct, abnormal EEG  15  Gout    Lexiscan nuclear stress test May 28, 2020:  1  Negative pharmacologic nuclear stress test for symptoms of angina pectoris and negative for ECG changes of ischemia  2  Probable normal myocardial perfusion scan without definite evidence of ischemia or infarction  3  Mildly dilated left ventricular cavity  Although the determined left ventricular ejection fraction was mildly decreased at 44%, visual evaluation suggests normal left ventricular systolic function and normal ejection fraction  4  Normal resting blood pressure with appropriate blood pressure response during the stress test   5  Baseline ECG abnormality with atrial paced rhythm with frequent premature ventricular ectopy noted during the stress test      Holter monitor May 2020: Total beats:  105,944     Ventricular Ectopy  Total VE Beats:  11,880  VE percentage:  11 2 %     Supraventricular Ectopy  Total SVE Beat:  3463  SVE percentage:  3 3 %     Atrial Fibrillation  AF beats:  0  AF percentage 0 %     CONCLUSIONS:  1  Holter monitor reveals the underlying rhythm is sinus rhythm with an average heart rate of 74 beats per minute, a minimum heart rate of 58 beats per minute and a maximum heart rate of 112 beats per minute  2  There were about 11,880 ventricular ectopic beats, however some beats are classified as ectopic ventricular beats when they are mostly paced beats     3  There were 37 ventricular runs classified by the Holter recording as ventricular tachycardia, however upon further review, the rhythm appears to be a paced ventricular tachycardic rhythm during these times, consider pacemaker mediated tachycardia  For the most part these ventricular runs are initiated by a premature ventricular contraction  4  There were about 3463 supraventricular ectopic beats, mostly occurring as single PAC's and late beats with no evidence of supraventricular tachycardia, atrial fibrillation, or atrial flutter  5  There is no evidence of significant bradyarrhythmia or advanced heart block  The longest R to R was 1 3 seconds    6  The patient's symptom diary reported no symptoms

## 2020-07-06 DIAGNOSIS — M1A.4790 OTHER SECONDARY CHRONIC GOUT OF ANKLE WITHOUT TOPHUS, UNSPECIFIED LATERALITY: ICD-10-CM

## 2020-07-06 DIAGNOSIS — R52 GENERALIZED BODY ACHES: ICD-10-CM

## 2020-07-06 RX ORDER — ALLOPURINOL 100 MG/1
TABLET ORAL
Qty: 90 TABLET | Refills: 0 | Status: SHIPPED | OUTPATIENT
Start: 2020-07-06 | End: 2020-08-13

## 2020-07-06 RX ORDER — GABAPENTIN 400 MG/1
400 CAPSULE ORAL DAILY
Qty: 90 CAPSULE | Refills: 0 | Status: SHIPPED | OUTPATIENT
Start: 2020-07-06 | End: 2020-08-13

## 2020-07-23 ENCOUNTER — TELEPHONE (OUTPATIENT)
Dept: CARDIOLOGY CLINIC | Facility: CLINIC | Age: 70
End: 2020-07-23

## 2020-07-23 NOTE — TELEPHONE ENCOUNTER
Pt called had dropped off list of BP reading July 9, can see are scanned in chart under media  Pt is dropping off additional readings today and were placed in your inbox     Pt states did not hear back from first reading   The patient states he is fatigue, lightheadedness on a regular basis, denies any near syncope  Some SOB  denies any fluid retention or weight gain  Please advise

## 2020-07-30 ENCOUNTER — TELEPHONE (OUTPATIENT)
Dept: CARDIOLOGY CLINIC | Facility: CLINIC | Age: 70
End: 2020-07-30

## 2020-07-30 DIAGNOSIS — I10 ESSENTIAL HYPERTENSION, BENIGN: Primary | ICD-10-CM

## 2020-07-30 RX ORDER — METOPROLOL TARTRATE 50 MG/1
50 TABLET, FILM COATED ORAL EVERY 12 HOURS SCHEDULED
Qty: 50 TABLET | Refills: 3 | Status: SHIPPED | OUTPATIENT
Start: 2020-07-30 | End: 2020-08-26 | Stop reason: SDUPTHER

## 2020-07-30 NOTE — TELEPHONE ENCOUNTER
On the patient to discuss results of his home pressure heart rate monitoring  His blood pressure is of between 292H to 776R systolic and 41I to 47D diastolic  Heart rates have been consistently at 60 which is the lower rate of his pacemaker setting  Patient reports that his blood pressure monitor reports skipped beats when he takes the blood pressure but he does not feel any palpitations  We are making the following changes to his regimen  - I am increasing his metoprolol succinate to 50 mg twice daily hoping that would suppress his premature ventricular contractions while improving blood pressure control   - is advised to continue monitoring his blood pressures  Other goal would be to maintain systolic blood pressure consistently less than 751 and diastolic less than 90  He is advised to let us know next week how he is feeling  If he is not at his goal then we will had amlodipine or ARB to his blood pressure regimen  - at his next device check we will likely increase his lower rate limit on his pacemaker to 65 if programmable of 70  Will keep the rate response on  Patient seemed to understand the plan and states that he will return to us next week

## 2020-07-30 NOTE — TELEPHONE ENCOUNTER
Pt called again has not heard from Dr Ching Kumar in reference to BP reading  The patient states that on BP monitor it tells hime Heart is irregular but can see has had VE on holter monitor  Pt states Dr Satish Madsen said his pacer may need to be adjusted due to BP reading so he would like call from doctor on how evaluates the current readings  Please advise

## 2020-08-06 ENCOUNTER — TELEPHONE (OUTPATIENT)
Dept: CARDIOLOGY CLINIC | Facility: CLINIC | Age: 70
End: 2020-08-06

## 2020-08-06 ENCOUNTER — TELEMEDICINE (OUTPATIENT)
Dept: FAMILY MEDICINE CLINIC | Facility: CLINIC | Age: 70
End: 2020-08-06
Payer: COMMERCIAL

## 2020-08-06 DIAGNOSIS — M25.50 ARTHRALGIA, UNSPECIFIED JOINT: ICD-10-CM

## 2020-08-06 DIAGNOSIS — M25.50 ARTHRALGIA, UNSPECIFIED JOINT: Primary | ICD-10-CM

## 2020-08-06 DIAGNOSIS — I10 ESSENTIAL HYPERTENSION: ICD-10-CM

## 2020-08-06 PROCEDURE — 1036F TOBACCO NON-USER: CPT | Performed by: NURSE PRACTITIONER

## 2020-08-06 PROCEDURE — 99214 OFFICE O/P EST MOD 30 MIN: CPT | Performed by: NURSE PRACTITIONER

## 2020-08-06 PROCEDURE — U0003 INFECTIOUS AGENT DETECTION BY NUCLEIC ACID (DNA OR RNA); SEVERE ACUTE RESPIRATORY SYNDROME CORONAVIRUS 2 (SARS-COV-2) (CORONAVIRUS DISEASE [COVID-19]), AMPLIFIED PROBE TECHNIQUE, MAKING USE OF HIGH THROUGHPUT TECHNOLOGIES AS DESCRIBED BY CMS-2020-01-R: HCPCS | Performed by: NURSE PRACTITIONER

## 2020-08-06 PROCEDURE — 3078F DIAST BP <80 MM HG: CPT | Performed by: NURSE PRACTITIONER

## 2020-08-06 PROCEDURE — 3075F SYST BP GE 130 - 139MM HG: CPT | Performed by: NURSE PRACTITIONER

## 2020-08-06 PROCEDURE — 1160F RVW MEDS BY RX/DR IN RCRD: CPT | Performed by: NURSE PRACTITIONER

## 2020-08-06 RX ORDER — AMLODIPINE BESYLATE 5 MG/1
5 TABLET ORAL DAILY
Qty: 90 TABLET | Refills: 1 | Status: SHIPPED | COMMUNITY
Start: 2020-08-06 | End: 2020-08-26 | Stop reason: SDUPTHER

## 2020-08-06 NOTE — ASSESSMENT & PLAN NOTE
-Today I am adding amlodipine 5mg daily to his regimen and to continue on metoprolol 50mg BID as ordered by Dr Trever Varela  Pt was also advised to write down his Bps for 2 weeks and call us with results  He was advised to make a follow up appointment with cardiology as well  If his symptoms worsen or fail to improve he can come into office or ER

## 2020-08-06 NOTE — TELEPHONE ENCOUNTER
Pt called is dropping off BP readings today states BP readings still elevated 140/104 151/102 Pt states he dosent feel medication is working at times has chest pain  The patient states pain in chest may be from back pain he has been experiencing and says he wants to be totally honest he has a lot of stress  Explained to patient pain and stress can also effect BP  Pt has scheduled OV with PCP today and told him to tell her the same things she is telling me  Told him if has chest pain should go to ED  Pt understood instructions, explained to patient that Dr Gaurav Pedro is off but would message him too

## 2020-08-06 NOTE — PROGRESS NOTES
Virtual Regular Visit      Assessment/Plan:    Problem List Items Addressed This Visit        Cardiovascular and Mediastinum    Essential hypertension     -Today I am adding amlodipine 5mg daily to his regimen and to continue on metoprolol 50mg BID as ordered by Dr Case Cifuentes  Pt was also advised to write down his Bps for 2 weeks and call us with results  He was advised to make a follow up appointment with cardiology as well  If his symptoms worsen or fail to improve he can come into office or ER  Relevant Medications    amLODIPine (NORVASC) 5 mg tablet       Other    Arthralgia - Primary    Relevant Orders    Novel Coronavirus (COVID-19), PCR [facility]               Reason for visit is   Chief Complaint   Patient presents with    Virtual Regular Visit    COVID-19        Encounter provider FELICITAS Henderson    Provider located at [facility] AT [address]  [address]  [phone]      Recent Visits  No visits were found meeting these conditions  Showing recent visits within past 7 days and meeting all other requirements     Today's Visits  Date Type Provider Dept   08/06/20 Telemedicine Jessie Robb, [address] today's visits and meeting all other requirements     Future Appointments  No visits were found meeting these conditions  Showing future appointments within next 150 days and meeting all other requirements        The patient was identified by name and date of birth  Libby Jimenez was informed that this is a telemedicine visit and that the visit is being conducted through [employer] and patient was informed that this is not a secure, HIPAA-complaint platform  He agrees to proceed     My office door was closed  No one else was in the room  He acknowledged consent and understanding of privacy and security of the video platform   The patient has agreed to participate and understands they can discontinue the visit at any time  Patient is aware this is a billable service  Subjective  Gerhard Ramirez is a 72 y o  male presents with arthralgia that started 6 days ago  States he continues with the body aches  He has a cough but he has had this for awhile since March with white phelgm  Denies fever  He feels the pain up to his chest per patient  Denies any sick contacts  Has not taken anything for his body aches  His blood pressure has been elevated in 150's/101  Was seen by cardiologist and has been taking metoprolol 50mg BID without improvement in his Bps  States he feels fatigued as well, as he always felt and he alaso has a pacemaker in place       HPI     Past Medical History:   Diagnosis Date    Allergic     Asthma     Enlarged prostate     High cholesterol     High triglycerides     Hypertension        Past Surgical History:   Procedure Laterality Date    HEMORROIDECTOMY      HERNIA REPAIR         Current Outpatient Medications   Medication Sig Dispense Refill    allopurinol (ZYLOPRIM) 100 mg tablet TAKE 1 TABLET BY MOUTH EVERY DAY 90 tablet 0    amLODIPine (NORVASC) 5 mg tablet Take 1 tablet (5 mg total) by mouth daily 90 tablet 1    aspirin (ECOTRIN LOW STRENGTH) 81 mg EC tablet Take 81 mg by mouth daily      atorvastatin (LIPITOR) 40 mg tablet Take 1 tablet (40 mg total) by mouth daily 90 tablet 1    Azelastine-Fluticasone (Dymista) 137-50 MCG/ACT SUSP 1 puff into each nostril daily (Patient not taking: Reported on 7/2/2020) 23 g 0    cetirizine (ZyrTEC) 10 mg tablet Take 1 tablet (10 mg total) by mouth daily 30 tablet 3    gabapentin (NEURONTIN) 400 mg capsule TAKE 1 CAPSULE (400 MG TOTAL) BY MOUTH DAILY 90 capsule 0    metoprolol tartrate (LOPRESSOR) 50 mg tablet Take 1 tablet (50 mg total) by mouth every 12 (twelve) hours 50 tablet 3    tamsulosin (FLOMAX) 0 4 mg Take 1 capsule (0 4 mg total) by mouth daily with dinner 90 capsule 2    vitamin B-12 (CYANOCOBALAMIN) 100 MCG tablet Take 100 mcg by mouth daily       No current facility-administered medications for this visit  No Known Allergies    Review of Systems   Constitutional: Positive for fatigue  HENT: Negative  Negative for congestion  Eyes: Negative  Respiratory: Positive for cough  Negative for chest tightness, shortness of breath and wheezing  Cardiovascular: Negative  Negative for chest pain and palpitations  Gastrointestinal: Negative  Endocrine: Negative  Genitourinary: Negative  Musculoskeletal: Positive for arthralgias  Skin: Negative  Allergic/Immunologic: Negative  Neurological: Negative  Negative for dizziness and headaches  Hematological: Negative  Psychiatric/Behavioral: Negative  Video Exam    There were no vitals filed for this visit  Physical Exam   Constitutional: He is oriented to person, place, and time  Non-toxic appearance  He does not appear ill  No distress  HENT:   Head: Normocephalic and atraumatic  Right Ear: External ear normal    Left Ear: External ear normal    Nose: Nose normal    Mouth/Throat: Mucous membranes are moist  Oropharynx is clear  Eyes: Pupils are equal, round, and reactive to light  Neck: Normal range of motion  Neck supple  Pulmonary/Chest: Effort normal  No respiratory distress  Abdominal: Soft  Normal appearance and bowel sounds are normal    Musculoskeletal:         General: Tenderness present  Neurological: He is alert and oriented to person, place, and time  Skin: Skin is warm and dry  Capillary refill takes less than 2 seconds  He is not diaphoretic  Psychiatric: His behavior is normal  Mood, judgment and thought content normal    Nursing note and vitals reviewed  I spent 15 minutes directly with the patient during this visit      [address] acknowledges that he has consented to an online visit or consultation   He understands that the online visit is based solely on information provided by him, and that, in the absence of a face-to-face physical evaluation by the physician, the diagnosis he receives is both limited and provisional in terms of accuracy and completeness  This is not intended to replace a full medical face-to-face evaluation by the physician  Blanca White understands and accepts these terms

## 2020-08-07 LAB — SARS-COV-2 RNA SPEC QL NAA+PROBE: NOT DETECTED

## 2020-08-12 DIAGNOSIS — M1A.4790 OTHER SECONDARY CHRONIC GOUT OF ANKLE WITHOUT TOPHUS, UNSPECIFIED LATERALITY: ICD-10-CM

## 2020-08-12 DIAGNOSIS — R52 GENERALIZED BODY ACHES: ICD-10-CM

## 2020-08-12 DIAGNOSIS — E78.2 MIXED HYPERLIPIDEMIA: ICD-10-CM

## 2020-08-13 RX ORDER — ATORVASTATIN CALCIUM 40 MG/1
TABLET, FILM COATED ORAL
Qty: 90 TABLET | Refills: 1 | Status: SHIPPED | OUTPATIENT
Start: 2020-08-13 | End: 2020-08-26 | Stop reason: SDUPTHER

## 2020-08-13 RX ORDER — GABAPENTIN 400 MG/1
400 CAPSULE ORAL DAILY
Qty: 90 CAPSULE | Refills: 0 | Status: SHIPPED | OUTPATIENT
Start: 2020-08-13 | End: 2020-08-26 | Stop reason: SDUPTHER

## 2020-08-13 RX ORDER — ALLOPURINOL 100 MG/1
TABLET ORAL
Qty: 90 TABLET | Refills: 0 | Status: SHIPPED | OUTPATIENT
Start: 2020-08-13 | End: 2020-08-26 | Stop reason: SDUPTHER

## 2020-08-26 ENCOUNTER — TELEPHONE (OUTPATIENT)
Dept: FAMILY MEDICINE CLINIC | Facility: CLINIC | Age: 70
End: 2020-08-26

## 2020-08-26 ENCOUNTER — OFFICE VISIT (OUTPATIENT)
Dept: FAMILY MEDICINE CLINIC | Facility: CLINIC | Age: 70
End: 2020-08-26
Payer: COMMERCIAL

## 2020-08-26 VITALS
HEART RATE: 75 BPM | BODY MASS INDEX: 30.57 KG/M2 | TEMPERATURE: 96.6 F | OXYGEN SATURATION: 95 % | WEIGHT: 206.4 LBS | HEIGHT: 69 IN | DIASTOLIC BLOOD PRESSURE: 90 MMHG | SYSTOLIC BLOOD PRESSURE: 130 MMHG

## 2020-08-26 DIAGNOSIS — R52 GENERALIZED BODY ACHES: ICD-10-CM

## 2020-08-26 DIAGNOSIS — N40.0 BENIGN PROSTATIC HYPERPLASIA WITHOUT LOWER URINARY TRACT SYMPTOMS: ICD-10-CM

## 2020-08-26 DIAGNOSIS — M1A.4790 OTHER SECONDARY CHRONIC GOUT OF ANKLE WITHOUT TOPHUS, UNSPECIFIED LATERALITY: ICD-10-CM

## 2020-08-26 DIAGNOSIS — I10 ESSENTIAL HYPERTENSION, BENIGN: ICD-10-CM

## 2020-08-26 DIAGNOSIS — R09.81 NASAL CONGESTION: ICD-10-CM

## 2020-08-26 DIAGNOSIS — E78.2 MIXED HYPERLIPIDEMIA: ICD-10-CM

## 2020-08-26 DIAGNOSIS — Z23 NEED FOR INFLUENZA VACCINATION: Primary | ICD-10-CM

## 2020-08-26 DIAGNOSIS — I10 ESSENTIAL HYPERTENSION: ICD-10-CM

## 2020-08-26 PROCEDURE — 90662 IIV NO PRSV INCREASED AG IM: CPT | Performed by: NURSE PRACTITIONER

## 2020-08-26 PROCEDURE — 1160F RVW MEDS BY RX/DR IN RCRD: CPT | Performed by: NURSE PRACTITIONER

## 2020-08-26 PROCEDURE — 99213 OFFICE O/P EST LOW 20 MIN: CPT | Performed by: NURSE PRACTITIONER

## 2020-08-26 PROCEDURE — 1036F TOBACCO NON-USER: CPT | Performed by: NURSE PRACTITIONER

## 2020-08-26 PROCEDURE — 3080F DIAST BP >= 90 MM HG: CPT | Performed by: NURSE PRACTITIONER

## 2020-08-26 PROCEDURE — G0008 ADMIN INFLUENZA VIRUS VAC: HCPCS | Performed by: NURSE PRACTITIONER

## 2020-08-26 PROCEDURE — 3008F BODY MASS INDEX DOCD: CPT | Performed by: NURSE PRACTITIONER

## 2020-08-26 PROCEDURE — 3075F SYST BP GE 130 - 139MM HG: CPT | Performed by: NURSE PRACTITIONER

## 2020-08-26 RX ORDER — ATORVASTATIN CALCIUM 40 MG/1
40 TABLET, FILM COATED ORAL DAILY
Qty: 90 TABLET | Refills: 1 | Status: SHIPPED | OUTPATIENT
Start: 2020-08-26

## 2020-08-26 RX ORDER — CETIRIZINE HYDROCHLORIDE 10 MG/1
10 TABLET ORAL DAILY
Qty: 90 TABLET | Refills: 1 | Status: SHIPPED | OUTPATIENT
Start: 2020-08-26

## 2020-08-26 RX ORDER — AMLODIPINE BESYLATE 5 MG/1
5 TABLET ORAL DAILY
Qty: 90 TABLET | Refills: 1 | Status: SHIPPED | OUTPATIENT
Start: 2020-08-26

## 2020-08-26 RX ORDER — METOPROLOL TARTRATE 50 MG/1
50 TABLET, FILM COATED ORAL EVERY 12 HOURS SCHEDULED
Qty: 60 TABLET | Refills: 2 | Status: SHIPPED | OUTPATIENT
Start: 2020-08-26

## 2020-08-26 RX ORDER — GABAPENTIN 400 MG/1
400 CAPSULE ORAL DAILY
Qty: 90 CAPSULE | Refills: 1 | Status: SHIPPED | OUTPATIENT
Start: 2020-08-26

## 2020-08-26 RX ORDER — MULTIVIT WITH MINERALS/LUTEIN
1000 TABLET ORAL DAILY
COMMUNITY

## 2020-08-26 RX ORDER — TAMSULOSIN HYDROCHLORIDE 0.4 MG/1
0.4 CAPSULE ORAL
Qty: 90 CAPSULE | Refills: 1 | Status: SHIPPED | OUTPATIENT
Start: 2020-08-26

## 2020-08-26 RX ORDER — ALLOPURINOL 100 MG/1
100 TABLET ORAL DAILY
Qty: 90 TABLET | Refills: 1 | Status: SHIPPED | OUTPATIENT
Start: 2020-08-26

## 2020-08-26 NOTE — ASSESSMENT & PLAN NOTE
-BP with much better control based on readings he brought into office  States he dropped off his readings to Dr Nicolas Fofana last week as well  Patient to continue on amlodipine 5mg and metoprolol 50mg as ordered  He is moving to [place] next week and unsure if he is going to follow up with cardiology here for pacemaker check  He states if he does not he will f/u in [place]

## 2020-08-26 NOTE — TELEPHONE ENCOUNTER
Pt was seen for an appt today Pt is moving to [address] [place] [address] 8/28/2020  Please remove PCP thank you

## 2020-08-26 NOTE — PROGRESS NOTES
Assessment/Plan:    Essential hypertension  -BP with much better control based on readings he brought into office  States he dropped off his readings to Dr Prashant Sheriff last week as well  Patient to continue on amlodipine 5mg and metoprolol 50mg as ordered  He is moving to [place] next week and unsure if he is going to follow up with cardiology here for pacemaker check  He states if he does not he will f/u in [place]  Diagnoses and all orders for this visit:    Need for influenza vaccination  -     influenza vaccine, high-dose, PF 0 7 mL (FLUZONE HIGH-DOSE)    Essential hypertension, benign  -     metoprolol tartrate (LOPRESSOR) 50 mg tablet; Take 1 tablet (50 mg total) by mouth every 12 (twelve) hours    Generalized body aches  -     gabapentin (NEURONTIN) 400 mg capsule; Take 1 capsule (400 mg total) by mouth daily    Mixed hyperlipidemia  -     atorvastatin (LIPITOR) 40 mg tablet; Take 1 tablet (40 mg total) by mouth daily    Essential hypertension  -     amLODIPine (NORVASC) 5 mg tablet; Take 1 tablet (5 mg total) by mouth daily    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, unspecified laterality  -     allopurinol (ZYLOPRIM) 100 mg tablet; Take 1 tablet (100 mg total) by mouth daily    Nasal congestion  -     cetirizine (ZyrTEC) 10 mg tablet; Take 1 tablet (10 mg total) by mouth daily    Benign prostatic hyperplasia without lower urinary tract symptoms  -     tamsulosin (FLOMAX) 0 4 mg; Take 1 capsule (0 4 mg total) by mouth daily with dinner    Other orders  -     Ascorbic Acid (vitamin C) 1000 MG tablet; Take 1,000 mg by mouth daily          Subjective:      Patient ID: Shant Orourke is a 72 y o  male  72year old male patient here for follow up hypertension  He is currently on amlodipine 5mg and since then his bps have been from 100-150's/ 78-90's, for most part has been normal now  Has continued on metoprolol as well   States he needs have his pacemaker checked but he is moving next week to [place] and will have this done out there  He is ready for a new beginning out in [place] per patient  Today he feels well without any complaints  The following portions of the patient's history were reviewed and updated as appropriate: allergies, current medications, past family history, past medical history, past social history, past surgical history and problem list     Review of Systems   Constitutional: Negative  Negative for appetite change, fatigue and fever  HENT: Negative  Eyes: Negative  Respiratory: Negative  Negative for cough, chest tightness, shortness of breath and wheezing  Cardiovascular: Negative  Negative for chest pain and palpitations  Gastrointestinal: Negative  Endocrine: Negative  Genitourinary: Negative  Musculoskeletal: Negative  Skin: Negative  Allergic/Immunologic: Negative  Neurological: Negative  Negative for dizziness and headaches  Hematological: Negative  Psychiatric/Behavioral: Negative  Objective:      /90 (BP Location: Left arm, Patient Position: Sitting, Cuff Size: Adult)   Pulse 75   Temp (!) 96 6 °F (35 9 °C) (Oral)   Ht 5' 9" (1 753 m)   Wt 93 6 kg (206 lb 6 4 oz)   SpO2 95%   BMI 30 48 kg/m²          Physical Exam  Vitals signs and nursing note reviewed  Constitutional:       General: He is not in acute distress  Appearance: Normal appearance  He is not ill-appearing, toxic-appearing or diaphoretic  HENT:      Head: Normocephalic and atraumatic  Right Ear: External ear normal       Left Ear: External ear normal       Nose: Nose normal  No congestion or rhinorrhea  Mouth/Throat:      Mouth: Mucous membranes are moist       Pharynx: Oropharynx is clear  No oropharyngeal exudate  Eyes:      Extraocular Movements: Extraocular movements intact  Conjunctiva/sclera: Conjunctivae normal       Pupils: Pupils are equal, round, and reactive to light     Neck:      Musculoskeletal: Normal range of motion and neck supple  Cardiovascular:      Rate and Rhythm: Normal rate and regular rhythm  Pulses: Normal pulses  Heart sounds: Normal heart sounds  Pulmonary:      Effort: Pulmonary effort is normal  No respiratory distress  Breath sounds: Normal breath sounds  Abdominal:      General: Bowel sounds are normal       Palpations: Abdomen is soft  Musculoskeletal: Normal range of motion  Skin:     General: Skin is warm and dry  Capillary Refill: Capillary refill takes less than 2 seconds  Neurological:      General: No focal deficit present  Mental Status: He is alert and oriented to person, place, and time  Psychiatric:         Mood and Affect: Mood normal          Behavior: Behavior normal          Thought Content:  Thought content normal          Judgment: Judgment normal

## 2020-08-31 ENCOUNTER — TELEPHONE (OUTPATIENT)
Dept: CARDIOLOGY CLINIC | Facility: CLINIC | Age: 70
End: 2020-08-31

## 2020-08-31 NOTE — TELEPHONE ENCOUNTER
I tried calling patient to review his most recent blood pressures which she provided to us  However was not able to reach the patient  Seems like patient no longer has this line  I am satisfied with his numbers though occasionally is diastolic blood pressure is still high  Reviewed his medications in the chart  Times satisfied with the current medical regimen    She is set

## 2020-09-29 NOTE — TELEPHONE ENCOUNTER
09/29/20 8:10 AM     Thank you for your request  Your request has been received, reviewed, and the patient chart updated  The PCP has successfully been removed with a patient attribution note  This message will now be completed      Thank you  Charlsie Scheuermann